# Patient Record
Sex: MALE | Race: OTHER | NOT HISPANIC OR LATINO
[De-identification: names, ages, dates, MRNs, and addresses within clinical notes are randomized per-mention and may not be internally consistent; named-entity substitution may affect disease eponyms.]

---

## 2017-11-10 ENCOUNTER — APPOINTMENT (OUTPATIENT)
Dept: HEART AND VASCULAR | Facility: CLINIC | Age: 42
End: 2017-11-10

## 2017-11-10 PROBLEM — Z00.00 ENCOUNTER FOR PREVENTIVE HEALTH EXAMINATION: Status: ACTIVE | Noted: 2017-11-10

## 2021-04-08 ENCOUNTER — FORM ENCOUNTER (OUTPATIENT)
Age: 46
End: 2021-04-08

## 2021-06-30 ENCOUNTER — NON-APPOINTMENT (OUTPATIENT)
Age: 46
End: 2021-06-30

## 2021-07-12 ENCOUNTER — APPOINTMENT (OUTPATIENT)
Dept: ORTHOPEDIC SURGERY | Facility: CLINIC | Age: 46
End: 2021-07-12

## 2022-12-29 ENCOUNTER — APPOINTMENT (OUTPATIENT)
Dept: ORTHOPEDIC SURGERY | Facility: CLINIC | Age: 47
End: 2022-12-29

## 2022-12-29 VITALS
BODY MASS INDEX: 26.6 KG/M2 | HEIGHT: 71 IN | DIASTOLIC BLOOD PRESSURE: 81 MMHG | WEIGHT: 190 LBS | SYSTOLIC BLOOD PRESSURE: 127 MMHG

## 2022-12-29 PROCEDURE — 73562 X-RAY EXAM OF KNEE 3: CPT | Mod: RT

## 2022-12-29 PROCEDURE — 99204 OFFICE O/P NEW MOD 45 MIN: CPT

## 2022-12-29 NOTE — DISCUSSION/SUMMARY
[All Questions Answered] : Patient (and family) had all questions answered to an agreeable level of satisfaction [Interested in Proceeding] : Patient (and family) expressed understanding and interest in proceeding with the plan as outlined [de-identified] : Patient had an MRI a year and a half ago or more.  I think would be appropriate to get a new study as it looks bigger than previous.  I will to get an MRI with contrast we can see if this is solid or cystic.  We discussed if it is cystic I can do an aspiration and see if it goes away otherwise if it is solid I would plan for biopsy of the area.  Following that we can discuss further treatment including possible surgery.\par \par If imaging was ordered, the patient was told to make an appointment to review findings right after all imaging is completed.\par \par We discussed risks, benefits and alternatives. Rationale of care was reviewed and all questions were answered. Patient (and family) had all questions answered to her degree of the level of satisfaction. Patient (and family) expressed understanding and interest in proceeding with the plan as outlined.\par \par \par \par \par This note was done with a voice recognition transcription software and any typos are related to this rather than medical error. Surgical risks reviewed. Patient (and family) had all questions answered to an agreeable level of satisfaction. Patient (and family) expressed understanding and interest in proceeding with the plan as outlined.  \par

## 2022-12-29 NOTE — CONSULT LETTER
[Dear  ___] : Dear  [unfilled], [FreeTextEntry2] : Karlos Valerio MD\par [address] [place] [address], \par [address]\par [place] [id number] [FreeTextEntry1] : \par After evaluating your patient and reviewing the studies presented we have come to a mutually agreeable plan. \par \par Please see my note below.\par \par Should you have further questions, please feel free to call and discuss the care of your patient.\par \par Thank you for the confidence of your referral.\par \par Sincerely, \par \par Kade Epperson MD \par Chief, Musculoskeletal Oncology \par \par [phone]\par [phone]

## 2022-12-29 NOTE — DATA REVIEWED
[Imaging Present] : Present [de-identified] : X-rays today multiple views of the right knee show 3 femoral and 2 tibial screws consistent with possible old ligament reconstruction.  The lateral sided femoral screw is backing out and the anterior proximal tibia screw is also backing out however these are unchanged from x-rays a year and a half ago.  There is degenerative changes.  There is a soft tissue shadow with no calcifications posteromedially.\par \par MRI scan from April 9, 2021 shows a 3.7 x 3 cm soft tissue lesion in the posterior medial aspect of the knee at the level of the superior posterior medial femoral condyle possibly a soft tissue ganglion.  There is also severe tricompartmental arthrosis severe chronic PCL sprain chronic MCL sprain complete chronic ACL tear.

## 2022-12-29 NOTE — PHYSICAL EXAM
[FreeTextEntry1] : On exam the patient stands in good balance.  He has well-healed incisions throughout.  He is able to get from 0 to 130 degrees.  He stable to varus and valgus testing.  He has some mild posterior more than anterior laxity.  He has no tenderness over the palpable screw heads.  He has a 5 cm mass in the posterior medial portions of soft tissue.  It is quite firm and not mobile.  There is no Tinel's thrills or bruits.  His calves are soft and he is neurovascular intact. [General Appearance - Well-Appearing] : Well appearing [General Appearance - Well Nourished] : well nourished [Oriented To Time, Place, And Person] : Oriented to person, place, and time [Impaired Insight] : Insight and judgment were intact [Affect] : The affect was normal. [Mood] : the mood was normal [Sclera] : the sclera and conjunctiva were normal [Neck Cervical Mass (___cm)] : no neck mass was observed [Heart Rate And Rhythm] : heart rate was normal and rhythm regular [] : No respiratory distress [Abdomen Soft] : Soft [Normal Station and Gait] : gait and station were normal [Tenderness] : no tenderness [Swelling] : swelling [Masses] : masses [Skin Changes - Describe changes:] : No skin changes noted [Normal] : Palpable DP and PT pulses, warm and pink with brisk capillary refill [Full ROM Unless otherwise noted:] : Full range of motion unless otherwise noted:

## 2022-12-29 NOTE — HISTORY OF PRESENT ILLNESS
[FreeTextEntry1] : This is a 47-year-old gentleman who has a history of significant ligamentous reconstruction when he was 17 or 18 years old after a basketball injury.  He did have some knee pain along the way but then got into shape and got his muscle stronger.  Since then he has been doing well however noticed a mass growing over the past 2 years in the posterior medial portion of his knee.  He does not remember any inciting event.  He knows he has hardware about his knee.  He was following up after an MRI 1-1/2 years ago but did not come [Stable] : stable [0] : currently ~his/her~ pain is 0 out of 10 [Bending] : not exacerbated by bending [Direct Pressure] : not exacerbated by direct pressure

## 2023-01-13 ENCOUNTER — TRANSCRIPTION ENCOUNTER (OUTPATIENT)
Age: 48
End: 2023-01-13

## 2023-01-15 ENCOUNTER — OUTPATIENT (OUTPATIENT)
Dept: OUTPATIENT SERVICES | Facility: HOSPITAL | Age: 48
LOS: 1 days | End: 2023-01-15
Payer: COMMERCIAL

## 2023-01-15 ENCOUNTER — APPOINTMENT (OUTPATIENT)
Dept: MRI IMAGING | Facility: CLINIC | Age: 48
End: 2023-01-15
Payer: COMMERCIAL

## 2023-01-15 DIAGNOSIS — M25.561 PAIN IN RIGHT KNEE: ICD-10-CM

## 2023-01-15 PROCEDURE — 73723 MRI JOINT LWR EXTR W/O&W/DYE: CPT

## 2023-01-15 PROCEDURE — 73723 MRI JOINT LWR EXTR W/O&W/DYE: CPT | Mod: 26,RT

## 2023-01-25 ENCOUNTER — APPOINTMENT (OUTPATIENT)
Dept: ORTHOPEDIC SURGERY | Facility: CLINIC | Age: 48
End: 2023-01-25
Payer: COMMERCIAL

## 2023-01-25 VITALS
DIASTOLIC BLOOD PRESSURE: 89 MMHG | HEART RATE: 74 BPM | HEIGHT: 71 IN | BODY MASS INDEX: 26.6 KG/M2 | SYSTOLIC BLOOD PRESSURE: 144 MMHG | WEIGHT: 190 LBS | OXYGEN SATURATION: 97 %

## 2023-01-25 DIAGNOSIS — M25.561 PAIN IN RIGHT KNEE: ICD-10-CM

## 2023-01-25 PROCEDURE — 99213 OFFICE O/P EST LOW 20 MIN: CPT | Mod: 25

## 2023-01-25 PROCEDURE — 20612 ASPIRATE/INJ GANGLION CYST: CPT

## 2023-01-30 PROBLEM — M25.561 POSTERIOR RIGHT KNEE PAIN: Status: ACTIVE | Noted: 2021-07-12

## 2023-01-30 NOTE — DISCUSSION/SUMMARY
[All Questions Answered] : Patient (and family) had all questions answered to an agreeable level of satisfaction [Interested in Proceeding] : Patient (and family) expressed understanding and interest in proceeding with the plan as outlined [de-identified] : Patient has thick gelatinous fluid consistent with an old ganglion cyst.  I was able to aspirate much of it and the MRI also showed it was cystic.  It certainly was softer after return.  This may take multiple times as it is very loculated however he is somewhat tender.  It may make sense to try and do a small I&D.  He will return for possible early aspiration in the future.  I do not think that surgery is necessarily indicated unless this comes back and becomes more painful.  This appears like benign cyst.  Follow-up again for repeat aspiration as necessary.\par \par If imaging was ordered, the patient was told to make an appointment to review findings right after all imaging is completed.\par \par We discussed risks, benefits and alternatives. Rationale of care was reviewed and all questions were answered. Patient (and family) had all questions answered to her degree of the level of satisfaction. Patient (and family) expressed understanding and interest in proceeding with the plan as outlined.\par \par \par \par \par This note was done with a voice recognition transcription software and any typos are related to this rather than medical error. Surgical risks reviewed. Patient (and family) had all questions answered to an agreeable level of satisfaction. Patient (and family) expressed understanding and interest in proceeding with the plan as outlined.  \par

## 2023-01-30 NOTE — HISTORY OF PRESENT ILLNESS
[FreeTextEntry1] : Overall symptomatology has not changed.  He is here to review his MRI scan.  This is related to the lesion in the posterior medial portion of his knee. [Stable] : stable [4] : currently ~his/her~ pain is 4 out of 10

## 2023-01-30 NOTE — PROCEDURE
[Aspiration] : Aspiration [Right] : of the right [Bleeding] : bleeding [Patient] : patient [Risk] : risk [Benefits] : benefits [Alternatives] : alternatives [Verbal Consent Obtained] : verbal consent was obtained prior to the procedure [Alcohol] : Alcohol [Ethyl Chloride Spray] : ethyl chloride spray was used as a topical anesthetic [Medial] : medial [Posterior] : posterior [Direct] : direct [1.5  inch] : 1.5 inch needle [___ mL Fluid] : [unfilled] mL of [Yellow] : yellow [Bandage Applied] : a bandage [Tolerated Well] : the patient tolerated the procedure well [None] : none [de-identified] : Medial cyst [de-identified] : 16g [de-identified] : Thick gelatinous

## 2023-01-30 NOTE — DATA REVIEWED
[Imaging Present] : Present [de-identified] : MRI scan from January 15:, 2023 shows:\par IMPRESSION:\par Complex cyst at the posterior medial soft tissues of the knee.\par \par Moderate tricompartmental osteoarthritis.\par

## 2023-05-03 ENCOUNTER — NON-APPOINTMENT (OUTPATIENT)
Age: 48
End: 2023-05-03

## 2023-05-03 ENCOUNTER — APPOINTMENT (OUTPATIENT)
Dept: CARDIOLOGY | Facility: CLINIC | Age: 48
End: 2023-05-03
Payer: COMMERCIAL

## 2023-05-03 VITALS
BODY MASS INDEX: 26.79 KG/M2 | SYSTOLIC BLOOD PRESSURE: 130 MMHG | WEIGHT: 191.38 LBS | OXYGEN SATURATION: 98 % | HEIGHT: 71 IN | DIASTOLIC BLOOD PRESSURE: 71 MMHG | HEART RATE: 65 BPM | TEMPERATURE: 97.8 F

## 2023-05-03 PROCEDURE — 93000 ELECTROCARDIOGRAM COMPLETE: CPT

## 2023-05-03 PROCEDURE — 99205 OFFICE O/P NEW HI 60 MIN: CPT | Mod: 25

## 2023-05-03 NOTE — HISTORY OF PRESENT ILLNESS
[FreeTextEntry1] : NERISSA MIGUEL 48 year M BMI 26 , presents with chest pain to [facility] admitted for elevated troponin had echo and stress and discharged but reports unavailable and does not recall seeing cardiologist. Raising two kids by himself and working and working out rigorously . no illicit drugs, no steroids, Does take creatine supplements.  abnormal EKG and reports  NYHA 2-3 dyspnea, non-exertional chest pain, no palpitations, no syncope, no claudication mild peripheral edema.   Tobacco Negative/Reformed. /71      ;  HR 65      BPM; NSR 66/MIN. RTC 6w. Echo and TMT pending RTC 6w.\par Total visit time 65min.\par

## 2023-05-04 LAB
ALBUMIN SERPL ELPH-MCNC: 4.6 G/DL
ALP BLD-CCNC: 64 U/L
ALT SERPL-CCNC: 8 U/L
ANION GAP SERPL CALC-SCNC: 13 MMOL/L
AST SERPL-CCNC: 21 U/L
BASOPHILS # BLD AUTO: 0.05 K/UL
BASOPHILS NFR BLD AUTO: 0.7 %
BILIRUB SERPL-MCNC: 0.5 MG/DL
BUN SERPL-MCNC: 19 MG/DL
CALCIUM SERPL-MCNC: 9.7 MG/DL
CHLORIDE SERPL-SCNC: 103 MMOL/L
CHOLEST SERPL-MCNC: 129 MG/DL
CO2 SERPL-SCNC: 24 MMOL/L
CREAT SERPL-MCNC: 1.05 MG/DL
EGFR: 88 ML/MIN/1.73M2
EOSINOPHIL # BLD AUTO: 0.07 K/UL
EOSINOPHIL NFR BLD AUTO: 1 %
GLUCOSE SERPL-MCNC: 75 MG/DL
HCT VFR BLD CALC: 46.8 %
HDLC SERPL-MCNC: 46 MG/DL
HGB BLD-MCNC: 14.8 G/DL
IMM GRANULOCYTES NFR BLD AUTO: 0.3 %
LDLC SERPL CALC-MCNC: 70 MG/DL
LYMPHOCYTES # BLD AUTO: 1.91 K/UL
LYMPHOCYTES NFR BLD AUTO: 26.3 %
MAN DIFF?: NORMAL
MCHC RBC-ENTMCNC: 28.7 PG
MCHC RBC-ENTMCNC: 31.6 GM/DL
MCV RBC AUTO: 90.7 FL
MONOCYTES # BLD AUTO: 0.52 K/UL
MONOCYTES NFR BLD AUTO: 7.2 %
NEUTROPHILS # BLD AUTO: 4.7 K/UL
NEUTROPHILS NFR BLD AUTO: 64.5 %
NONHDLC SERPL-MCNC: 83 MG/DL
NT-PROBNP SERPL-MCNC: 120 PG/ML
PLATELET # BLD AUTO: 193 K/UL
POTASSIUM SERPL-SCNC: 4.3 MMOL/L
PROT SERPL-MCNC: 6.9 G/DL
RBC # BLD: 5.16 M/UL
RBC # FLD: 13.8 %
SODIUM SERPL-SCNC: 139 MMOL/L
TRIGL SERPL-MCNC: 63 MG/DL
TROPONIN I SERPL-MCNC: 0.02 NG/ML
WBC # FLD AUTO: 7.27 K/UL

## 2023-05-25 ENCOUNTER — APPOINTMENT (OUTPATIENT)
Dept: CARDIOLOGY | Facility: CLINIC | Age: 48
End: 2023-05-25

## 2023-06-06 ENCOUNTER — APPOINTMENT (OUTPATIENT)
Dept: ORTHOPEDIC SURGERY | Facility: CLINIC | Age: 48
End: 2023-06-06
Payer: COMMERCIAL

## 2023-06-06 PROCEDURE — 20610 DRAIN/INJ JOINT/BURSA W/O US: CPT | Mod: RT

## 2023-06-06 PROCEDURE — 99213 OFFICE O/P EST LOW 20 MIN: CPT | Mod: 25

## 2023-06-09 NOTE — DISCUSSION/SUMMARY
[de-identified] : Patient has mass as before.  After aspiration he did significantly better.  We discussed doing aspiration in the future versus a surgical resection.  He understands that because of the intra-articular problems and his previous surgery this may come back however I think that a lot of the solid material did not come out and we were able to take out some of the liquid.  He is feeling significantly better.  She did not want surgery for tender discussing this in the future.  Follow-up again as needed.\par \par If imaging was ordered, the patient was told to make an appointment to review findings right after all imaging is completed.\par \par We discussed risks, benefits and alternatives. Rationale of care was reviewed and all questions were answered. Patient (and family) had all questions answered to her degree of the level of satisfaction. Patient (and family) expressed understanding and interest in proceeding with the plan as outlined.\par \par \par \par \par This note was done with a voice recognition transcription software and any typos are related to this rather than medical error. Surgical risks reviewed. Patient (and family) had all questions answered to an agreeable level of satisfaction. Patient (and family) expressed understanding and interest in proceeding with the plan as outlined.  \par

## 2023-06-09 NOTE — PHYSICAL EXAM
[General Appearance - Well-Appearing] : Well appearing [General Appearance - Well Nourished] : well nourished [Oriented To Time, Place, And Person] : Oriented to person, place, and time [Impaired Insight] : Insight and judgment were intact [Affect] : The affect was normal. [Mood] : the mood was normal [Sclera] : the sclera and conjunctiva were normal [Neck Cervical Mass (___cm)] : no neck mass was observed [Heart Rate And Rhythm] : heart rate was normal and rhythm regular [] : No respiratory distress [Abdomen Soft] : Soft [Normal Station and Gait] : gait and station were normal [Swelling] : swelling [Masses] : masses [Normal] : Palpable DP and PT pulses, warm and pink with brisk capillary refill [Full ROM Unless otherwise noted:] : Full range of motion unless otherwise noted: [FreeTextEntry1] : On exam the patient stands in good balance.  He has well-healed incisions throughout.  He is able to get from 0 to 130 degrees.  He stable to varus and valgus testing.  He has some mild posterior more than anterior laxity.  He has no tenderness over the palpable screw heads.  He has a 3cm mass in the posterior medial portions of soft tissue.  It is quite firm and not mobile.  There is no Tinel's thrills or bruits.  His calves are soft and he is neurovascular intact. [Tenderness] : no tenderness [Skin Changes - Describe changes:] : No skin changes noted

## 2023-06-09 NOTE — PROCEDURE
[de-identified] : Under sterile conditions I aspirated the area on the posterior medial portion of the right knee.  There was approximately 4 cc of bloody myxoid type material.  I then injected the area with 40 mg of Depo-Medrol afterwards.  He tolerated this well and the mass got significantly smaller.

## 2023-06-09 NOTE — HISTORY OF PRESENT ILLNESS
[Stable] : stable [1] : currently ~his/her~ pain is 1 out of 10 [FreeTextEntry1] : Since last I saw the patient 5 months ago he has been doing very well.  More recently he felt a little bit of continued swelling but overall he is much happier than he was before.  He has had a new mass he wants to discuss further options.

## 2023-06-23 ENCOUNTER — NON-APPOINTMENT (OUTPATIENT)
Age: 48
End: 2023-06-23

## 2023-07-11 ENCOUNTER — NON-APPOINTMENT (OUTPATIENT)
Age: 48
End: 2023-07-11

## 2023-08-01 ENCOUNTER — APPOINTMENT (OUTPATIENT)
Dept: CARDIOLOGY | Facility: CLINIC | Age: 48
End: 2023-08-01

## 2023-08-15 ENCOUNTER — APPOINTMENT (OUTPATIENT)
Dept: ORTHOPEDIC SURGERY | Facility: CLINIC | Age: 48
End: 2023-08-15
Payer: COMMERCIAL

## 2023-08-15 VITALS
HEIGHT: 71 IN | DIASTOLIC BLOOD PRESSURE: 79 MMHG | SYSTOLIC BLOOD PRESSURE: 128 MMHG | BODY MASS INDEX: 26.74 KG/M2 | WEIGHT: 191 LBS

## 2023-08-15 DIAGNOSIS — T84.84XA PAIN DUE TO INTERNAL ORTHOPEDIC PROSTHETIC DEVICES, IMPLANTS AND GRAFTS, INITIAL ENCOUNTER: ICD-10-CM

## 2023-08-15 PROCEDURE — 99214 OFFICE O/P EST MOD 30 MIN: CPT

## 2023-08-16 PROBLEM — T84.84XA PAINFUL ORTHOPAEDIC HARDWARE: Status: ACTIVE | Noted: 2023-08-16

## 2023-08-16 NOTE — DISCUSSION/SUMMARY
[Surgical risks reviewed] : Surgical risks reviewed [All Questions Answered] : Patient (and family) had all questions answered to an agreeable level of satisfaction [Interested in Proceeding] : Patient (and family) expressed understanding and interest in proceeding with the plan as outlined [de-identified] : Patient was thinking about aspiration versus surgery.  I told him I would not aspirate if we are thinking about doing surgery in the near future which she does want to do.  At the same time I would also plan on taking out the screws on the medial side.  We can take out anything that is symptomatic in the anterior tibia but I think it is mostly the medial side that is a problem.  I would take out the screw and staple and then also plan to come around posteromedially to take out the mass.  While his incision medially is somewhat anterior to the mass I think it could be extended and would be appropriate before taking out the hardware.  He wants to do all of this at once from the medial side.  Risk benefits and alternatives discussed including numbness tingling and recurrence of lesion.  If imaging was ordered, the patient was told to make an appointment to review findings right after all imaging is completed.  We discussed risks, benefits and alternatives. Rationale of care was reviewed and all questions were answered. Patient (and family) had all questions answered to her degree of the level of satisfaction. Patient (and family) expressed understanding and interest in proceeding with the plan as outlined.     This note was done with a voice recognition transcription software and any typos are related to this rather than medical error. Surgical risks reviewed. Patient (and family) had all questions answered to an agreeable level of satisfaction. Patient (and family) expressed understanding and interest in proceeding with the plan as outlined.

## 2023-08-16 NOTE — HISTORY OF PRESENT ILLNESS
[FreeTextEntry1] : Have not seen the patient in quite a few months.  He is still having some pain and recurrence of the mass.  He wanted to look into possibly having it out. [Stable] : stable [3] : currently ~his/her~ pain is 3 out of 10 [Intermit.] : ~He/She~ states the symptoms seem to be intermittent [Direct Pressure] : worsened by direct pressure

## 2023-08-16 NOTE — PHYSICAL EXAM
[FreeTextEntry1] : On exam the patient stands in good balance.  He has well-healed incisions throughout.  He is able to get from 0 to 130 degrees.  He stable to varus and valgus testing.  He has some mild posterior more than anterior laxity.  He has no tenderness over the palpable screw heads.  He has a 3cm mass in the posterior medial portions of soft tissue.  It is quite firm and not mobile.  There is no Tinel's thrills or bruits.  His calves are soft and he is neurovascular intact. [General Appearance - Well-Appearing] : Well appearing [General Appearance - Well Nourished] : well nourished [Oriented To Time, Place, And Person] : Oriented to person, place, and time [Impaired Insight] : Insight and judgment were intact [Affect] : The affect was normal. [Mood] : the mood was normal [Sclera] : the sclera and conjunctiva were normal [Neck Cervical Mass (___cm)] : no neck mass was observed [Heart Rate And Rhythm] : heart rate was normal and rhythm regular [] : No respiratory distress [Abdomen Soft] : Soft [Normal Station and Gait] : gait and station were normal [Tenderness] : no tenderness [Swelling] : swelling [Masses] : masses [Skin Changes - Describe changes:] : No skin changes noted [Normal] : Palpable DP and PT pulses, warm and pink with brisk capillary refill [Full ROM Unless otherwise noted:] : Full range of motion unless otherwise noted:

## 2023-12-04 ENCOUNTER — APPOINTMENT (OUTPATIENT)
Dept: ORTHOPEDIC SURGERY | Facility: CLINIC | Age: 48
End: 2023-12-04
Payer: COMMERCIAL

## 2023-12-04 VITALS — BODY MASS INDEX: 25.33 KG/M2 | HEIGHT: 71.5 IN | WEIGHT: 185 LBS

## 2023-12-04 PROCEDURE — 99214 OFFICE O/P EST MOD 30 MIN: CPT

## 2023-12-04 PROCEDURE — 73562 X-RAY EXAM OF KNEE 3: CPT | Mod: RT

## 2023-12-06 ENCOUNTER — APPOINTMENT (OUTPATIENT)
Dept: CARDIOLOGY | Facility: CLINIC | Age: 48
End: 2023-12-06

## 2023-12-13 ENCOUNTER — OUTPATIENT (OUTPATIENT)
Dept: OUTPATIENT SERVICES | Facility: HOSPITAL | Age: 48
LOS: 1 days | End: 2023-12-13

## 2023-12-13 ENCOUNTER — NON-APPOINTMENT (OUTPATIENT)
Age: 48
End: 2023-12-13

## 2023-12-13 ENCOUNTER — APPOINTMENT (OUTPATIENT)
Dept: CARDIOLOGY | Facility: CLINIC | Age: 48
End: 2023-12-13
Payer: COMMERCIAL

## 2023-12-13 VITALS
SYSTOLIC BLOOD PRESSURE: 128 MMHG | BODY MASS INDEX: 26.74 KG/M2 | OXYGEN SATURATION: 98 % | WEIGHT: 191 LBS | HEART RATE: 81 BPM | DIASTOLIC BLOOD PRESSURE: 81 MMHG | HEIGHT: 71 IN | TEMPERATURE: 98 F

## 2023-12-13 VITALS
RESPIRATION RATE: 18 BRPM | DIASTOLIC BLOOD PRESSURE: 79 MMHG | WEIGHT: 192.9 LBS | TEMPERATURE: 98 F | HEIGHT: 72 IN | OXYGEN SATURATION: 99 % | SYSTOLIC BLOOD PRESSURE: 116 MMHG | HEART RATE: 71 BPM

## 2023-12-13 DIAGNOSIS — R22.41 LOCALIZED SWELLING, MASS AND LUMP, RIGHT LOWER LIMB: ICD-10-CM

## 2023-12-13 DIAGNOSIS — R79.89 OTHER SPECIFIED ABNORMAL FINDINGS OF BLOOD CHEMISTRY: ICD-10-CM

## 2023-12-13 DIAGNOSIS — T84.84XA PAIN DUE TO INTERNAL ORTHOPEDIC PROSTHETIC DEVICES, IMPLANTS AND GRAFTS, INITIAL ENCOUNTER: ICD-10-CM

## 2023-12-13 DIAGNOSIS — I10 ESSENTIAL (PRIMARY) HYPERTENSION: ICD-10-CM

## 2023-12-13 DIAGNOSIS — Z98.890 OTHER SPECIFIED POSTPROCEDURAL STATES: Chronic | ICD-10-CM

## 2023-12-13 DIAGNOSIS — R07.9 CHEST PAIN, UNSPECIFIED: ICD-10-CM

## 2023-12-13 LAB
ANION GAP SERPL CALC-SCNC: 9 MMOL/L — SIGNIFICANT CHANGE UP (ref 7–14)
ANION GAP SERPL CALC-SCNC: 9 MMOL/L — SIGNIFICANT CHANGE UP (ref 7–14)
BUN SERPL-MCNC: 17 MG/DL — SIGNIFICANT CHANGE UP (ref 7–23)
BUN SERPL-MCNC: 17 MG/DL — SIGNIFICANT CHANGE UP (ref 7–23)
CALCIUM SERPL-MCNC: 9.1 MG/DL — SIGNIFICANT CHANGE UP (ref 8.4–10.5)
CALCIUM SERPL-MCNC: 9.1 MG/DL — SIGNIFICANT CHANGE UP (ref 8.4–10.5)
CHLORIDE SERPL-SCNC: 104 MMOL/L — SIGNIFICANT CHANGE UP (ref 98–107)
CHLORIDE SERPL-SCNC: 104 MMOL/L — SIGNIFICANT CHANGE UP (ref 98–107)
CO2 SERPL-SCNC: 26 MMOL/L — SIGNIFICANT CHANGE UP (ref 22–31)
CO2 SERPL-SCNC: 26 MMOL/L — SIGNIFICANT CHANGE UP (ref 22–31)
CREAT SERPL-MCNC: 0.69 MG/DL — SIGNIFICANT CHANGE UP (ref 0.5–1.3)
CREAT SERPL-MCNC: 0.69 MG/DL — SIGNIFICANT CHANGE UP (ref 0.5–1.3)
EGFR: 114 ML/MIN/1.73M2 — SIGNIFICANT CHANGE UP
EGFR: 114 ML/MIN/1.73M2 — SIGNIFICANT CHANGE UP
GLUCOSE SERPL-MCNC: 90 MG/DL — SIGNIFICANT CHANGE UP (ref 70–99)
GLUCOSE SERPL-MCNC: 90 MG/DL — SIGNIFICANT CHANGE UP (ref 70–99)
HCT VFR BLD CALC: 46.5 % — SIGNIFICANT CHANGE UP (ref 39–50)
HCT VFR BLD CALC: 46.5 % — SIGNIFICANT CHANGE UP (ref 39–50)
HGB BLD-MCNC: 15 G/DL — SIGNIFICANT CHANGE UP (ref 13–17)
HGB BLD-MCNC: 15 G/DL — SIGNIFICANT CHANGE UP (ref 13–17)
MCHC RBC-ENTMCNC: 29.1 PG — SIGNIFICANT CHANGE UP (ref 27–34)
MCHC RBC-ENTMCNC: 29.1 PG — SIGNIFICANT CHANGE UP (ref 27–34)
MCHC RBC-ENTMCNC: 32.3 GM/DL — SIGNIFICANT CHANGE UP (ref 32–36)
MCHC RBC-ENTMCNC: 32.3 GM/DL — SIGNIFICANT CHANGE UP (ref 32–36)
MCV RBC AUTO: 90.1 FL — SIGNIFICANT CHANGE UP (ref 80–100)
MCV RBC AUTO: 90.1 FL — SIGNIFICANT CHANGE UP (ref 80–100)
NRBC # BLD: 0 /100 WBCS — SIGNIFICANT CHANGE UP (ref 0–0)
NRBC # BLD: 0 /100 WBCS — SIGNIFICANT CHANGE UP (ref 0–0)
NRBC # FLD: 0 K/UL — SIGNIFICANT CHANGE UP (ref 0–0)
NRBC # FLD: 0 K/UL — SIGNIFICANT CHANGE UP (ref 0–0)
PLATELET # BLD AUTO: 240 K/UL — SIGNIFICANT CHANGE UP (ref 150–400)
PLATELET # BLD AUTO: 240 K/UL — SIGNIFICANT CHANGE UP (ref 150–400)
POTASSIUM SERPL-MCNC: 4.4 MMOL/L — SIGNIFICANT CHANGE UP (ref 3.5–5.3)
POTASSIUM SERPL-MCNC: 4.4 MMOL/L — SIGNIFICANT CHANGE UP (ref 3.5–5.3)
POTASSIUM SERPL-SCNC: 4.4 MMOL/L — SIGNIFICANT CHANGE UP (ref 3.5–5.3)
POTASSIUM SERPL-SCNC: 4.4 MMOL/L — SIGNIFICANT CHANGE UP (ref 3.5–5.3)
RBC # BLD: 5.16 M/UL — SIGNIFICANT CHANGE UP (ref 4.2–5.8)
RBC # BLD: 5.16 M/UL — SIGNIFICANT CHANGE UP (ref 4.2–5.8)
RBC # FLD: 14.6 % — HIGH (ref 10.3–14.5)
RBC # FLD: 14.6 % — HIGH (ref 10.3–14.5)
SODIUM SERPL-SCNC: 139 MMOL/L — SIGNIFICANT CHANGE UP (ref 135–145)
SODIUM SERPL-SCNC: 139 MMOL/L — SIGNIFICANT CHANGE UP (ref 135–145)
WBC # BLD: 5.53 K/UL — SIGNIFICANT CHANGE UP (ref 3.8–10.5)
WBC # BLD: 5.53 K/UL — SIGNIFICANT CHANGE UP (ref 3.8–10.5)
WBC # FLD AUTO: 5.53 K/UL — SIGNIFICANT CHANGE UP (ref 3.8–10.5)
WBC # FLD AUTO: 5.53 K/UL — SIGNIFICANT CHANGE UP (ref 3.8–10.5)

## 2023-12-13 PROCEDURE — 93000 ELECTROCARDIOGRAM COMPLETE: CPT

## 2023-12-13 PROCEDURE — 99215 OFFICE O/P EST HI 40 MIN: CPT | Mod: 25

## 2023-12-13 PROCEDURE — 93306 TTE W/DOPPLER COMPLETE: CPT

## 2023-12-13 RX ORDER — SODIUM CHLORIDE 9 MG/ML
1000 INJECTION, SOLUTION INTRAVENOUS
Refills: 0 | Status: DISCONTINUED | OUTPATIENT
Start: 2023-12-20 | End: 2024-01-03

## 2023-12-13 NOTE — H&P PST ADULT - NSICDXPASTMEDICALHX_GEN_ALL_CORE_FT
PAST MEDICAL HISTORY:  History of diverticulitis     History of repair of ACL     HTN (hypertension)      PAST MEDICAL HISTORY:  History of diverticulitis     History of repair of ACL     HTN (hypertension)     Tear of medial meniscus of left knee

## 2023-12-13 NOTE — H&P PST ADULT - NSANTHOSAYNRD_GEN_A_CORE
No. ROXI screening performed.  STOP BANG Legend: 0-2 = LOW Risk; 3-4 = INTERMEDIATE Risk; 5-8 = HIGH Risk

## 2023-12-13 NOTE — H&P PST ADULT - HISTORY OF PRESENT ILLNESS
49 y/o male with hx of right knee ACL reconstruction 1/1993, presents for preop evaluation for Resection of right knee mass and removal of Hardware for right knee tentatively for 12/20/23.   Pt has hx HTN, elevate Troponin levels associated with chest pains and 3/2023- pt was followed by cardiologist, had stress test' was placed on antihypertensive meds, Plavix and Aspirin therapy, which was recently stopped 8/2023 by cradiologist. Pt scheduled today for Echo and cardiac clearance. for this procedure.

## 2023-12-13 NOTE — H&P PST ADULT - PROBLEM SELECTOR PLAN 3
Pt has hx chest pains associated with sob 3/20/23. Pt was followed by cardiologist, had stress test and scheduled for echo today  with cardia clearance

## 2023-12-13 NOTE — H&P PST ADULT - NSICDXFAMILYHX_GEN_ALL_CORE_FT
FAMILY HISTORY:  Father  Still living? No  FH: lung cancer, Age at diagnosis: 51-60    Mother  Still living? Yes, Estimated age: Age Unknown  Family history of diabetes mellitus (DM), Age at diagnosis: 61-70  Family history of early CAD, Age at diagnosis: 61-70

## 2023-12-13 NOTE — H&P PST ADULT - HEART RATE (BEATS/MIN)
-- DO NOT REPLY / DO NOT REPLY ALL --  -- Message is from the [facility]--    COVID-19 Universal Screening: Negative    Patient is requesting a medication refill - the medication was not listed on the patient's active medication list.    Prescribing Provider:Honey White MD     RX Name:  lovastatin  Dose:  20 MG  Quantity:  90  Instruction(s):  Take 1 tablet by mouth daily.     Duration: 90 days    Pharmacy  [facility] [address] [place] [address]    Patient confirmed the above pharmacy as correct?  Yes    Caller Information       Type Contact Phone    04/17/2020 08:40 AM Phone (Incoming) Rachael Harman (Self) [phone] (H)          Alternative phone number: None Given     Turnaround time given to caller:   \"This message will be sent to [state Provider's name]. The clinical team will fulfill your request as soon as they review your message.\"  
faxed  
71

## 2023-12-13 NOTE — HISTORY OF PRESENT ILLNESS
[FreeTextEntry1] : NERISSA MIGUEL 48-year M BMI 26 chest pain to [facility]  Works out rigorously. no illicit drugs, no steroids. Does take creatine supplements.  abnormal EKG /81; HR 81/m 12/23 EKG NSR 79/min PVC PPRWP Echo and Stress requested 5/23 6/23 Stress nuc neg/neg LVEF 49% BNP < 300. 12/23 Echo LVEF 62% LAE mild MR mild [facility] mild. RCRI score 0-1 low periop MACE (major adverse cardiovascular event) risk. No prohibitive cardiac contraindications to planned procedure. Patient optimized for planned procedure.

## 2023-12-13 NOTE — H&P PST ADULT - ATTENDING COMMENTS
I have reviewed and agree with note as written above  for removal of hardware and removal of cyst, Right knee  Risks, benefits and alternatives discussed with patient.  Kade Epperson MD  Musculoskeletal Oncology  [phone] I have reviewed and agree with note as written above  for removal of hardware and removal of cyst, Right knee  Risks, benefits and alternatives discussed with patient.  Kade Epperson MD  Musculoskeletal Oncology  [phone]

## 2023-12-13 NOTE — H&P PST ADULT - PROBLEM SELECTOR PLAN 1
47 y/o male with hx of right knee ACL reconstruction 1/1993, presents for preop evaluation for Resection of right knee mass and removal of Hardware for right knee tentatively for 12/20/23.   Pre-op instructions provided. Pt given verbal and written instructions with teach back on chlorhexidine shampoo and Pepcid. Pt verbalized understanding with return demonstration.   Pending labs and cardiac clearance 49 y/o male with hx of right knee ACL reconstruction 1/1993, presents for preop evaluation for Resection of right knee mass and removal of Hardware for right knee tentatively for 12/20/23.   Pre-op instructions provided. Pt given verbal and written instructions with teach back on chlorhexidine shampoo and Pepcid. Pt verbalized understanding with return demonstration.   Pending labs and cardiac clearance

## 2023-12-13 NOTE — H&P PST ADULT - CARDIOVASCULAR COMMENTS
c/o chest pains with SOB 3/2023 - seen by cardio - elevated Troponin, had stress test and echo scheduled for Echo today and cardiac clearance for this procedure

## 2023-12-19 ENCOUNTER — TRANSCRIPTION ENCOUNTER (OUTPATIENT)
Age: 48
End: 2023-12-19

## 2023-12-19 NOTE — ASU PATIENT PROFILE, ADULT - NSICDXPASTMEDICALHX_GEN_ALL_CORE_FT
PAST MEDICAL HISTORY:  History of diverticulitis     History of repair of ACL     HTN (hypertension)     Tear of medial meniscus of left knee

## 2023-12-19 NOTE — ASU PATIENT PROFILE, ADULT - TEACHING/LEARNING CULTURAL CONSIDERATIONS
none Clindamycin Counseling: I counseled the patient regarding use of clindamycin as an antibiotic for prophylactic and/or therapeutic purposes. Clindamycin is active against numerous classes of bacteria, including skin bacteria. Side effects may include nausea, diarrhea, gastrointestinal upset, rash, hives, yeast infections, and in rare cases, colitis.

## 2023-12-19 NOTE — ASU PATIENT PROFILE, ADULT - FALL HARM RISK - UNIVERSAL INTERVENTIONS
Bed in lowest position, wheels locked, appropriate side rails in place/Call bell, personal items and telephone in reach/Instruct patient to call for assistance before getting out of bed or chair/Non-slip footwear when patient is out of bed/[place] to call system/Physically safe environment - no spills, clutter or unnecessary equipment/Purposeful Proactive Rounding/Room/bathroom lighting operational, light cord in reach Bed in lowest position, wheels locked, appropriate side rails in place/Call bell, personal items and telephone in reach/Instruct patient to call for assistance before getting out of bed or chair/Non-slip footwear when patient is out of bed/[place] to call system/Physically safe environment - no spills, clutter or unnecessary equipment/Purposeful Proactive Rounding/Room/bathroom lighting operational, light cord in reach

## 2023-12-20 ENCOUNTER — APPOINTMENT (OUTPATIENT)
Dept: ORTHOPEDIC SURGERY | Facility: HOSPITAL | Age: 48
End: 2023-12-20

## 2023-12-20 ENCOUNTER — TRANSCRIPTION ENCOUNTER (OUTPATIENT)
Age: 48
End: 2023-12-20

## 2023-12-20 ENCOUNTER — OUTPATIENT (OUTPATIENT)
Dept: OUTPATIENT SERVICES | Facility: HOSPITAL | Age: 48
LOS: 1 days | Discharge: HOME | End: 2023-12-20
Payer: COMMERCIAL

## 2023-12-20 VITALS
HEART RATE: 64 BPM | RESPIRATION RATE: 16 BRPM | DIASTOLIC BLOOD PRESSURE: 88 MMHG | OXYGEN SATURATION: 100 % | TEMPERATURE: 98 F | SYSTOLIC BLOOD PRESSURE: 142 MMHG

## 2023-12-20 VITALS
SYSTOLIC BLOOD PRESSURE: 125 MMHG | TEMPERATURE: 97 F | OXYGEN SATURATION: 98 % | RESPIRATION RATE: 16 BRPM | DIASTOLIC BLOOD PRESSURE: 76 MMHG | HEIGHT: 72 IN | WEIGHT: 192.9 LBS | HEART RATE: 66 BPM

## 2023-12-20 DIAGNOSIS — Z98.890 OTHER SPECIFIED POSTPROCEDURAL STATES: Chronic | ICD-10-CM

## 2023-12-20 DIAGNOSIS — R22.41 LOCALIZED SWELLING, MASS AND LUMP, RIGHT LOWER LIMB: ICD-10-CM

## 2023-12-20 PROBLEM — S83.242A OTHER TEAR OF MEDIAL MENISCUS, CURRENT INJURY, LEFT KNEE, INITIAL ENCOUNTER: Chronic | Status: ACTIVE | Noted: 2023-12-13

## 2023-12-20 PROBLEM — I10 ESSENTIAL (PRIMARY) HYPERTENSION: Chronic | Status: ACTIVE | Noted: 2023-12-13

## 2023-12-20 PROCEDURE — 88304 TISSUE EXAM BY PATHOLOGIST: CPT | Mod: 26

## 2023-12-20 PROCEDURE — 27339 EXC THIGH/KNEE TUM DEP 5CM/>: CPT | Mod: RT

## 2023-12-20 DEVICE — HEMOSORB SMALL
Type: IMPLANTABLE DEVICE | Site: RIGHT | Status: NON-FUNCTIONAL
Removed: 2023-12-20

## 2023-12-20 RX ORDER — OXYCODONE HYDROCHLORIDE 5 MG/1
5 TABLET ORAL ONCE
Refills: 0 | Status: DISCONTINUED | OUTPATIENT
Start: 2023-12-20 | End: 2023-12-20

## 2023-12-20 RX ORDER — ACETAMINOPHEN 500 MG
650 TABLET ORAL ONCE
Refills: 0 | Status: COMPLETED | OUTPATIENT
Start: 2023-12-20 | End: 2023-12-20

## 2023-12-20 RX ORDER — ONDANSETRON 8 MG/1
4 TABLET, FILM COATED ORAL ONCE
Refills: 0 | Status: DISCONTINUED | OUTPATIENT
Start: 2023-12-20 | End: 2024-01-03

## 2023-12-20 RX ORDER — OXYCODONE HYDROCHLORIDE 5 MG/1
1 TABLET ORAL
Qty: 6 | Refills: 0
Start: 2023-12-20

## 2023-12-20 RX ORDER — HYDROMORPHONE HYDROCHLORIDE 2 MG/ML
0.5 INJECTION INTRAMUSCULAR; INTRAVENOUS; SUBCUTANEOUS
Refills: 0 | Status: DISCONTINUED | OUTPATIENT
Start: 2023-12-20 | End: 2023-12-20

## 2023-12-20 RX ADMIN — SODIUM CHLORIDE 30 MILLILITER(S): 9 INJECTION, SOLUTION INTRAVENOUS at 09:16

## 2023-12-20 RX ADMIN — Medication 650 MILLIGRAM(S): at 09:15

## 2023-12-20 RX ADMIN — HYDROMORPHONE HYDROCHLORIDE 0.5 MILLIGRAM(S): 2 INJECTION INTRAMUSCULAR; INTRAVENOUS; SUBCUTANEOUS at 12:39

## 2023-12-20 RX ADMIN — HYDROMORPHONE HYDROCHLORIDE 0.5 MILLIGRAM(S): 2 INJECTION INTRAMUSCULAR; INTRAVENOUS; SUBCUTANEOUS at 11:41

## 2023-12-20 NOTE — ASU DISCHARGE PLAN (ADULT/PEDIATRIC) - NS MD DC FALL RISK RISK
For information on Fall & Injury Prevention, visit: [email or web address] OR  [email or web address] OR  https://www.cdc.gov/steadi/patient.html For information on Fall & Injury Prevention, visit: [email or web address] OR  [email or web address] OR  https://www.cdc.gov/steadi/patient.html

## 2023-12-20 NOTE — ASU DISCHARGE PLAN (ADULT/PEDIATRIC) - NURSING INSTRUCTIONS
You received IV Tylenol for pain management at 9:15am. Please DO NOT take any Tylenol (Acetaminophen) containing products, such as Vicodin, Percocet, Excedrin, and cold medications for the next 6 hours (until 3:15PM). DO NOT TAKE MORE THAN 3000 MG OF TYLENOL in a 24 hour period. You received IV Tylenol for pain management at 9:15am. Please DO NOT take any Tylenol (Acetaminophen) containing products, such as Vicodin, Percocet, Excedrin, and cold medications for the next 6 hours (until 3:15PM). DO NOT TAKE MORE THAN 3000 MG OF TYLENOL in a 24 hour period.  Oxycodone as needed for pain. Do not take pain medication on an empty stomach.  Increase fluids and fiber in diet to prevent constipation.

## 2023-12-20 NOTE — BRIEF OPERATIVE NOTE - NSICDXBRIEFPOSTOP_GEN_ALL_CORE_FT
POST-OP DIAGNOSIS:  Knee mass, right 20-Dec-2023 11:05:24 and retained orthopaedic hardware Billy Randall

## 2023-12-20 NOTE — ASU DISCHARGE PLAN (ADULT/PEDIATRIC) - D. IF YOU HAD ANY TYPE OF SEDATION, YOU MAY EXPERIENCE LIGHTHEADEDNESS, DIZZINESS, OR SLEEPINESS FOLLOWING YOUR PROCEDURE. A RESPONSIBLE ADULT SHOULD STAY WITH YOU FOR AT LEAST 24 HOURS FOLLOWING YOUR PROCEDURE.
COVID and labs ordered.   Letter completed and routed to [facility] to send.   Called patient to discuss pre procedure instructions. No answer.  left for return call. Call back number provided.    Orders entered  
Patient returned call to clinic. Discussed pre procedure instructions. Patient verbalized understanding and has no further questions at this time.   
Procedure scheduled 12/17/21 @ 2:30pm. Covid testing scheduled 12/15/21 @ 9:20am. Letter sent to RN for completion.  
Statement Selected

## 2023-12-20 NOTE — ASU DISCHARGE PLAN (ADULT/PEDIATRIC) - FOLLOW UP APPOINTMENTS
may also call Recovery Room (PACU) 24/7 @ [phone]/[facility] may also call Recovery Room (PACU) 24/7 @ [phone]/[facility]

## 2023-12-20 NOTE — ASU DISCHARGE PLAN (ADULT/PEDIATRIC) - CARE PROVIDER_API CALL
Kade Epperson  Orthopaedic Surgery  [address]  [place] [address]  Phone: [phone]  Fax: [phone]  Follow Up Time:    Kade Epperson  Orthopaedic Surgery  [address]  [place] [address]  Phone: [phone]  Fax: [phone]  Follow Up Time:

## 2023-12-20 NOTE — ASU PREOP CHECKLIST - PATIENT PROBLEMS/NEEDS
RETURN IF WORSENING PAIN, TROUBLE HOLDING STOOL/URINE, RADIATION OF PAIN, OR WEAKNESS/NUMBNESS Patient expressed no known problems or needs

## 2023-12-20 NOTE — BRIEF OPERATIVE NOTE - NSICDXBRIEFPREOP_GEN_ALL_CORE_FT
PRE-OP DIAGNOSIS:  Knee mass, right 20-Dec-2023 11:05:14 and retained orthopaedic hardware Billy Randall

## 2023-12-20 NOTE — ASU PREOP CHECKLIST - BMI (KG/M2)
Communicate with outside Psychiatrist / family to determine Psych med dosing for pt's depression
26.2

## 2023-12-20 NOTE — BRIEF OPERATIVE NOTE - NSICDXBRIEFPROCEDURE_GEN_ALL_CORE_FT
PROCEDURES:  Excision, mass, femur 20-Dec-2023 11:04:35 excision right knee medial mass, removal of hardware - medial screw and stable Billy Randall

## 2023-12-20 NOTE — ASU DISCHARGE PLAN (ADULT/PEDIATRIC) - ASU DC SPECIAL INSTRUCTIONSFT
1.	Pain Control as needed with over the counter medications (Acetaminophen/Ibuprofen). Oxycodone has been sent to your pharmacy and can be taken as needed for severe pain.   2.	Walking with full weight bearing as tolerated, with assistive devices (walker/Cane as Needed)  3.	Follow up with Dr. Epperson as Outpatient in 10-14 Days after Discharge unless otherwise instructed. Call Office For Appointment.  4.	Remove Dressing Post-Op Day 10, with Daily Dressing Changes as Need.  5.	Ice / elevate right knee as Needed to reduce pain and swelling  6.	Keep Dressing  Clean and dry.

## 2023-12-20 NOTE — BRIEF OPERATIVE NOTE - OPERATION/FINDINGS
right knee with previous medial screw and staple in place (removed) and medial sided knee mass ganglion appearance extending from knee joint

## 2023-12-20 NOTE — ASU DISCHARGE PLAN (ADULT/PEDIATRIC) - CALL YOUR DOCTOR IF YOU HAVE ANY OF THE FOLLOWING:
Bleeding that does not stop/Swelling that gets worse/Pain not relieved by Medications/Wound/Surgical Site with redness, or foul smelling discharge or pus Bleeding that does not stop/Swelling that gets worse/Pain not relieved by Medications/Fever greater than (need to indicate Fahrenheit or Celsius)/Wound/Surgical Site with redness, or foul smelling discharge or pus/Numbness, tingling, color or temperature change to extremity/Nausea and vomiting that does not stop

## 2024-01-04 ENCOUNTER — APPOINTMENT (OUTPATIENT)
Dept: ORTHOPEDIC SURGERY | Facility: CLINIC | Age: 49
End: 2024-01-04
Payer: COMMERCIAL

## 2024-01-04 DIAGNOSIS — M23.8X1 OTHER INTERNAL DERANGEMENTS OF RIGHT KNEE: ICD-10-CM

## 2024-01-04 DIAGNOSIS — R22.41 LOCALIZED SWELLING, MASS AND LUMP, RIGHT LOWER LIMB: ICD-10-CM

## 2024-01-04 PROCEDURE — 99024 POSTOP FOLLOW-UP VISIT: CPT

## 2024-01-15 NOTE — HISTORY OF PRESENT ILLNESS
[___ Weeks Post Op] : [unfilled] weeks post op [de-identified] : 12/20/2024 -removal of hardware and posterolateral knee mass right knee [de-identified] : Patient is doing well postoperatively.  He has less pain than he did before.  He still has some swelling in the area of the old resected mass. [de-identified] : His incision is clean dry and intact.  He is able to move around appropriately with range of motion of 0 to 120 degrees.  He does have some swelling in the area. [de-identified] : Pathology was consistent with cyst and cyst contents. [de-identified] : 2 weeks postop.  He is doing well however he does have some swelling consistent with residual cyst.  We did open up the area going down to the capsule so it is not very surprising.  We discussed aspiration versus observation.  It is likely just fluid at this point and will likely get better.  I can see him again in a few weeks if it does not get better we can aspirate further at that point. [de-identified] : He can go back to regular activity in the meantime.  If imaging or pathology/biopsy was ordered, the patient was told to make an appointment to review findings right after all imaging is completed.  We discussed risks, benefits and alternatives. Rationale of care was reviewed and all questions were answered. Patient (and family) had all questions answered to her degree of the level of satisfaction. Patient (and family) expressed understanding and interest in proceeding with the plan as outlined.     This note was done with a voice recognition transcription software and any typos are related to this rather than medical error. Surgical risks reviewed. Patient (and family) had all questions answered to an agreeable level of satisfaction. Patient (and family) expressed understanding and interest in proceeding with the plan as outlined.

## 2024-01-18 PROBLEM — Z98.890 OTHER SPECIFIED POSTPROCEDURAL STATES: Chronic | Status: ACTIVE | Noted: 2023-12-13

## 2024-01-18 PROBLEM — Z87.19 PERSONAL HISTORY OF OTHER DISEASES OF THE DIGESTIVE SYSTEM: Chronic | Status: ACTIVE | Noted: 2023-12-13

## 2024-05-20 NOTE — ASU PATIENT PROFILE, ADULT - FALL HARM RISK - CONCLUSION
Called to schedule appointment with Dr. Owens in December. Let patients wife know provider will only be at [facility] now. Added to new provider list. If they decided to continue to see Roman they will call to schedule.    Did schedule a missed tuite appt with wife  Patient confirmed scheduled appointment:  Date: 6/14/24   Time: 1:45pm  Visit type: Return Movement  Provider: Tuite  Location: [facility]  Testing/imaging:   Additional notes:       Adore Herrera on 5/20/2024 at 3:00 PM    
Universal Safety Interventions

## 2024-08-01 NOTE — ASU PATIENT PROFILE, ADULT - PATIENT REPRESENTATIVE NAME
Detail Level: Simple
Prescription Strength Graduated Compression Stockings Recommendations: The patient was counseled that prescription strength graduated compression stockings should be worn for all waking hours. They will follow up with a venous specialist to monitor graduated compression stocking usage and their symptoms.
Nayely

## (undated) DEVICE — PACK LIJ BASIC ORTHO

## (undated) DEVICE — DRSG COBAN 4"

## (undated) DEVICE — VENODYNE/SCD SLEEVE CALF MEDIUM

## (undated) DEVICE — DRSG COBAN COMPRESSION SYSTEM 2 LAYER

## (undated) DEVICE — ELCTR GROUNDING PAD ADULT COVIDIEN

## (undated) DEVICE — NDL HYPO SAFE 22G X 1.5" (BLACK)

## (undated) DEVICE — ELCTR BOVIE TIP NEEDLE INSULATED 2.8" EDGE

## (undated) DEVICE — GLV 8 PROTEXIS (CREAM) MICRO

## (undated) DEVICE — POSITIONER STRAP ARMBOARD VELCRO TS-30

## (undated) DEVICE — SUT MONOCRYL 4-0 27" PS-2 UNDYED

## (undated) DEVICE — TRAP SPECIMEN SPUTUM 40CC

## (undated) DEVICE — DRSG KLING 4"

## (undated) DEVICE — DRSG MASTISOL

## (undated) DEVICE — WARMING BLANKET LOWER ADULT

## (undated) DEVICE — DRAPE SURGICAL #1010

## (undated) DEVICE — PREP CHLORAPREP HI-LITE ORANGE 26ML

## (undated) DEVICE — SUT VICRYL 2-0 27" FS-1 UNDYED

## (undated) DEVICE — DRSG ACE BANDAGE 4" NS

## (undated) DEVICE — PROTECTOR HEEL / ELBOW

## (undated) DEVICE — ELCTR BOVIE PENCIL SMOKE EVACUATION

## (undated) DEVICE — DRAPE IOBAN 23" X 23"

## (undated) DEVICE — SUT VICRYL 0 27" OS-6 UNDYED

## (undated) DEVICE — TOURNIQUET ESMARK 6"

## (undated) DEVICE — FRAZIER SUCTION TIP 8FR

## (undated) DEVICE — DRAPE COVER SNAP 36X30"

## (undated) DEVICE — LABELS BLANK W PEN

## (undated) DEVICE — NDL HYPO REGULAR BEVEL 25G X 1.5" (BLUE)

## (undated) DEVICE — TAPE SILK 3"

## (undated) DEVICE — DRSG XEROFORM 5 X 9"

## (undated) DEVICE — SUT VICRYL 2-0 27" CP-1 UNDYED

## (undated) DEVICE — SUT ETHILON 2-0 18" FS

## (undated) DEVICE — SUT MONOCRYL 4-0 18" P-3 UNDYED

## (undated) DEVICE — DRSG STOCKINETTE IMPERVIOUS XL

## (undated) DEVICE — LIJ-SYNTHES SCREW REMOVAL SET (IMPLANT): Type: DURABLE MEDICAL EQUIPMENT

## (undated) DEVICE — DRAPE 3/4 SHEET 52X76"

## (undated) DEVICE — CONNECTOR 5 IN1 SUCTION TUBING

## (undated) DEVICE — DRSG TELFA 3 X 8

## (undated) DEVICE — PROTECTOR HEEL / ELBOW FLUFFY

## (undated) DEVICE — DRAPE U POLY BLUE 60"X60"

## (undated) DEVICE — PACKING GAUZE PLAIN 2"

## (undated) DEVICE — ELCTR BOVIE TIP BLADE INSULATED 2.8" EDGE WITH SAFETY

## (undated) DEVICE — SUT VICRYL 2-0 27" PS-2 UNDYED

## (undated) DEVICE — DRSG STERISTRIPS 0.5 X 4"